# Patient Record
Sex: MALE | URBAN - METROPOLITAN AREA
[De-identification: names, ages, dates, MRNs, and addresses within clinical notes are randomized per-mention and may not be internally consistent; named-entity substitution may affect disease eponyms.]

---

## 2021-12-02 ENCOUNTER — TELEPHONE (OUTPATIENT)
Dept: SURGERY | Age: 26
End: 2021-12-02

## 2021-12-02 ENCOUNTER — OFFICE VISIT (OUTPATIENT)
Dept: SURGERY | Age: 26
End: 2021-12-02

## 2021-12-02 VITALS — WEIGHT: 185.3 LBS | HEIGHT: 70 IN | BODY MASS INDEX: 26.53 KG/M2

## 2021-12-02 DIAGNOSIS — S01.81XA FACIAL LACERATION, INITIAL ENCOUNTER: Primary | ICD-10-CM

## 2021-12-02 PROCEDURE — 12011 RPR F/E/E/N/L/M 2.5 CM/<: CPT | Performed by: PHYSICIAN ASSISTANT

## 2021-12-06 ENCOUNTER — OFFICE VISIT (OUTPATIENT)
Dept: SURGERY | Age: 26
End: 2021-12-06

## 2021-12-06 DIAGNOSIS — S01.21XS LACERATION OF NOSE, SEQUELA: Primary | ICD-10-CM

## 2021-12-06 PROCEDURE — 99024 POSTOP FOLLOW-UP VISIT: CPT | Performed by: PHYSICIAN ASSISTANT

## 2024-09-13 ENCOUNTER — OFFICE VISIT (OUTPATIENT)
Dept: PRIMARY CARE | Facility: CLINIC | Age: 29
End: 2024-09-13
Payer: COMMERCIAL

## 2024-09-13 VITALS
SYSTOLIC BLOOD PRESSURE: 126 MMHG | OXYGEN SATURATION: 96 % | HEART RATE: 68 BPM | DIASTOLIC BLOOD PRESSURE: 78 MMHG | WEIGHT: 191 LBS | TEMPERATURE: 98 F

## 2024-09-13 DIAGNOSIS — Z00.00 ENCOUNTER FOR WELLNESS EXAMINATION: Primary | ICD-10-CM

## 2024-09-13 PROBLEM — Z22.322 MRSA COLONIZATION: Status: ACTIVE | Noted: 2022-05-11

## 2024-09-13 PROCEDURE — 1036F TOBACCO NON-USER: CPT | Performed by: INTERNAL MEDICINE

## 2024-09-13 PROCEDURE — 99385 PREV VISIT NEW AGE 18-39: CPT | Performed by: INTERNAL MEDICINE

## 2024-09-13 RX ORDER — MUPIROCIN 20 MG/G
OINTMENT TOPICAL
COMMUNITY

## 2024-09-13 RX ORDER — TACROLIMUS 1 MG/G
OINTMENT TOPICAL
COMMUNITY
Start: 2024-09-05

## 2024-09-13 RX ORDER — HYDROCORTISONE 25 MG/G
CREAM TOPICAL
COMMUNITY
Start: 2024-08-26

## 2024-09-13 NOTE — PROGRESS NOTES
"Subjective   Patient ID: Shankar Duffy \"Barbie" is a 29 y.o. male who presents for New Patient Visit.    Past medical history includes hyperlipidemia and MRSA colonization.    He is currently seeing Dr. Malgorzata Leahy, dermatology, for treatment of recurrent superficial staph infections with development of increasing antibiotic resistance, currently trying to treat skin primarily.    Reports a history of childhood asthma which he has outgrown.  Still has seasonal allergies for which she takes OTC antihistamines.    No past surgical history other than ear tubes in childhood    We reviewed his labs which did show some mild hyperlipidemia.  Also noted some mild polycythemia and metabolic alkalosis.  Discussed risk factors and signs/symptoms of DEEPIKA.  He thinks he snores, often tired but thinks its due to lack of sleep.  Agreed to monitor.    , works remotely as an  for [employer].  Wife works at [employer].  2 kids, 3-year-old Joy and 1-year-old Thalia.  No history of tobacco alcohol or drug use.  Plays basketball about 3 times a week for exercise.  Objective   Physical Exam    /78   Pulse 68   Temp 36.7 °C (98 °F)   Wt 86.6 kg (191 lb)   SpO2 96%    Gen: NAD, pleasant, A&;Ox3  HEENT: PERRL, EOMI, MMM, OP clear  Neck: supple, no thyromegaly, no JVD, normal carotid upstroke  Pulm: lungs CTAB, good air movement  CV: RRR, no m/r/g, 2+ DP pulses  Abd: NABS, soft, NT, ND no HSM  Ext: no peripheral edema  Neuro: CN II-XII intact, no focal sensory or motor deficits, normal reflexes      Assessment/Plan     Health maintenance  -Metabolic screening reviewed from 2023  -Last colonoscopy: Initiate at 48yo  -PSA: No early screening indicated  -Smoking history: Never  -Counseled regarding diet and exercise  -Immunizations: Reports current  -Followup for AWV and as needed    Cole Taylor MD       "

## 2024-12-17 ENCOUNTER — APPOINTMENT (OUTPATIENT)
Dept: INFECTIOUS DISEASES | Facility: CLINIC | Age: 29
End: 2024-12-17
Payer: COMMERCIAL

## 2024-12-17 VITALS
DIASTOLIC BLOOD PRESSURE: 73 MMHG | HEIGHT: 70 IN | SYSTOLIC BLOOD PRESSURE: 131 MMHG | TEMPERATURE: 97 F | BODY MASS INDEX: 27.41 KG/M2 | HEART RATE: 60 BPM

## 2024-12-17 DIAGNOSIS — Z22.322 MRSA COLONIZATION: Primary | ICD-10-CM

## 2024-12-17 PROCEDURE — 99204 OFFICE O/P NEW MOD 45 MIN: CPT | Performed by: INTERNAL MEDICINE

## 2024-12-17 ASSESSMENT — PAIN SCALES - GENERAL: PAINLEVEL_OUTOF10: 0-NO PAIN

## 2024-12-17 NOTE — PROGRESS NOTES
"Infectious Diseases Clinic Consult Note:    Referred by Cole Taylor MD  Primary MD: Cole Taylor MD  Reason for Consult: Folliculitis     History of Current Issue  Shankar Duffy is a 29 y.o. year old male  referred to establish care in ID for recurrent folliculitis.  Recently moved to [facility] from [facility] to be closer to family.    He has suffered from seborrheic dermatitis since childhood, it involves mostly his scalp and face.    He has received hydrocortisone in the past but is currently on topical tacrolimus.  He was followed in [place] by Dr. Bonnie Crespo  He would use chlorhexidine baths and mupirocin twice daily for 5-day courses, twice monthly.  Despite having had doxycycline resistant MRSA, per documentation his outbreaks of folliculitis would improve despite this.  He was also followed by dermatology in [place] treated and is currently under the care of of Malgorzata Melo MD for dermatology.    Of note, he is  with 2 children.  His 18-month-old also had issues with MRSA.  His infant has not had issues with recurrent skin infections nor has his wife.  Pets.  Currently, he uses daily tacrolimus for seborrhea maintenance as well as intranasal mupirocin 2-3 times daily for 10 days when he develops nasal pustules.    Today, describes everything as \"quiet\"   Last oral antibiotics was 2-3 months ago.  Would like to avoid oral antibiotics if possible  Has used Ketoconazole shampoo and creams in past.    PAST MEDICAL HISTORY:  No past medical history on file.  PAST SURGICAL HISTORY:  No past surgical history on file.  ALLERGIES:    No Known Allergies    MEDICATIONS:      Current Outpatient Medications:     mupirocin (Bactroban) 2 % ointment, APPLY TO AFFECTED AREA 3 TIMES A DAY FOR 10 DAYS, Disp: , Rfl:     tacrolimus (Protopic) 0.1 % ointment, APPLY TWICE A DAY RASH NOSE, Disp: , Rfl:     hydrocortisone 2.5 % cream, TWICE DAILY RASH ON AROUND NOSE (Patient not taking: Reported on 12/17/2024), Disp: , " "Rfl:     FAMILY HISTORY:   Family History   Problem Relation Name Age of Onset    Hyperlipidemia Mother          And other maternal relatives    Fainting Father      Hyperthyroidism Father      Prostate cancer Paternal Grandfather          SOCIAL HISTORY:       Marital Status:    Tobacco / Smokeless tobacco/ Vaping:   reports that he has never smoked. He has never used smokeless tobacco.   Alcohol:   Social History     Substance and Sexual Activity   Alcohol Use Never      Drug Use:    Social History     Substance and Sexual Activity   Drug Use Never       Travel:  [place], [place]   Pets:  None    Service:  No    IMMUNIZATIONS:    Immunization History   Administered Date(s) Administered    Flu vaccine (IIV4), preservative free *Check age/dose* 10/25/2020    Flu vaccine, quadrivalent, no egg protein, age 6 month or greater (FLUCELVAX) 11/23/2021    Flu vaccine, quadrivalent, recombinant, preservative free, adult (FLUBLOK) 11/21/2022    Moderna SARS-CoV-2 Vaccination 04/06/2021, 05/09/2021       REVIEW OF SYSTEMS: All pertinent positives and negatives as documented in HPI.      PHYSICAL EXAMINATION:       Visit Vitals  /73   Pulse 60   Temp 36.1 °C (97 °F)   Ht 1.778 m (5' 10\")   BMI 27.41 kg/m²   Smoking Status Never   BSA 2.07 m²        EXAM:   Constitutional: WD, NAD  Eyes: PERRL, anicteric sclera.   ENT:  MMM, no oral lesions noted, no thrush ; no intranasal lesions in nares  NECK: Supple, no LAD  LUNGS: Breathing unlabored.  Clear in all lung fields.  CVS: RRR, S1 & S2 normal.    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  Last available culture data from a lesion on his nasolabial fold grew MRSA resistant to trimethoprim sulfa, rifampin, and clindamycin.  He has been managed with oral doxycycline once he develops flares of with the lightest.  Previously he had been cycled with decolonization utilizing chlorhexidine baths and intranasal mupirocin twice monthly.  At this point, we are going to monitor without chronic oral antibiotics.  He was given a culture swab    As well as instructions on how to collect a specimen if he develops a pustule again.  We can attempt to culture material while beginning empiric therapy with doxycycline and assessing if the resistance pattern of his Staphylococcus has changed.  I had suggested that his his    Toddler continues to have issues with recurrent staph infection, it may be prudent to attempt decolonize in the entire family while also paying close attention to fomite management.  This would be done to attempt to decolonize family members and break the cycle of recolonization.    He will reach out to my office if new infection develops and a virtual visit can be scheduled.    I spent 55 minutes in the professional and overall care of this patient.      Magali Berg MD  (please reach through EPIC Chat)  Infectious Diseases, Senior Attending Physician

## 2024-12-17 NOTE — LETTER
02/06/25    Cole Taylor MD  [id number] [facility] [address]  [place] [address]      Dear Dr. Cole Taylor MD,    Thank you for referring your patient, Ervin Duffy, to receive care in my office. I have enclosed a summary of the care provided to Ervin on 02/06/25.    Please contact me with any questions you may have regarding the visit.    Sincerely,         Magali Berg MD  [address]  [facility] [address]  [facility] [facility] [address]    CC: No Recipients

## 2024-12-17 NOTE — LETTER
02/06/25    Cole Taylor MD  [address] [facility] [address]  [place] [address]      Dear Dr. Cole Taylor MD,    I am writing to confirm that your patient, Ervin Duffy, received care in my office on 02/06/25. I have enclosed a summary of the care provided to Ervin for your reference.    Please contact me with any questions you may have regarding the visit.    Sincerely,         Magali Berg MD  [id number]  [facility] [address]  [place] [id number]    CC: No Recipients

## 2024-12-17 NOTE — PATIENT INSTRUCTIONS
"It was great to meet you today.  I have given you a swab to collect any drainage if you were to develop a new pustule and could not get in to see me or another of your physicians.  Wipe the area with alcohol and use the swab just at the pustule.  This is mainly to obtain a breakdown of medications that work and monitor resistance, as we dicussed.    We will hold off on oral antibiotics as long as we are able.    If you notice your children having flares of pustules, it would be prudent to   try to decolonize the entire family at the same time as some individuals can be colonized without having any signs or symptoms but Staph can be transmitted from person to person through \"fomites\" = inanimate objects such as towels, bedding, stuffed animals, etc.    If you have another outbreak, reach out to my office and we can arrange for video visit.    Wishing you and your loved ones a very Happy Holiday Season!!  Be safe and see you next year!!     "

## 2025-01-17 ENCOUNTER — TELEPHONE (OUTPATIENT)
Dept: INFECTIOUS DISEASES | Facility: HOSPITAL | Age: 30
End: 2025-01-17
Payer: COMMERCIAL

## 2025-02-06 DIAGNOSIS — A49.02 MRSA (METHICILLIN RESISTANT STAPHYLOCOCCUS AUREUS) INFECTION: Primary | ICD-10-CM

## 2025-02-06 DIAGNOSIS — Z22.322 MRSA COLONIZATION: ICD-10-CM

## 2025-02-06 RX ORDER — LINEZOLID 600 MG/1
600 TABLET, FILM COATED ORAL 2 TIMES DAILY
Qty: 20 TABLET | Refills: 0 | Status: SHIPPED | OUTPATIENT
Start: 2025-02-06 | End: 2025-02-16

## 2025-02-06 RX ORDER — RIFAMPIN 300 MG/1
CAPSULE ORAL
Qty: 20 CAPSULE | Refills: 0 | Status: SHIPPED | OUTPATIENT
Start: 2025-02-06

## 2025-02-06 NOTE — PROGRESS NOTES
Prescriptions sent for 10 days of Linezolid and Rifampin which will hopefully resolve the colonization of MRSA as well as his current folliculitis.  Advised to change bed sheets etc.  Would suggest that his wife ask her PCP for MRSA screening as well as his chidren have also had Staphylococcal issues.

## 2025-05-11 ENCOUNTER — OFFICE VISIT (OUTPATIENT)
Dept: URGENT CARE | Age: 30
End: 2025-05-11
Payer: COMMERCIAL

## 2025-05-11 VITALS
DIASTOLIC BLOOD PRESSURE: 83 MMHG | WEIGHT: 187 LBS | SYSTOLIC BLOOD PRESSURE: 154 MMHG | HEART RATE: 97 BPM | TEMPERATURE: 98.7 F | BODY MASS INDEX: 26.83 KG/M2 | OXYGEN SATURATION: 98 % | RESPIRATION RATE: 18 BRPM

## 2025-05-11 DIAGNOSIS — J02.9 SORE THROAT: ICD-10-CM

## 2025-05-11 DIAGNOSIS — J02.0 STREPTOCOCCAL PHARYNGITIS: Primary | ICD-10-CM

## 2025-05-11 LAB
POC HUMAN RHINOVIRUS PCR: NEGATIVE
POC INFLUENZA A VIRUS PCR: NEGATIVE
POC INFLUENZA B VIRUS PCR: NEGATIVE
POC RESPIRATORY SYNCYTIAL VIRUS PCR: NEGATIVE
POC STREPTOCOCCUS PYOGENES (GROUP A STREP) PCR: POSITIVE

## 2025-05-11 PROCEDURE — 87651 STREP A DNA AMP PROBE: CPT | Performed by: NURSE PRACTITIONER

## 2025-05-11 PROCEDURE — 1036F TOBACCO NON-USER: CPT | Performed by: NURSE PRACTITIONER

## 2025-05-11 PROCEDURE — 99203 OFFICE O/P NEW LOW 30 MIN: CPT | Performed by: NURSE PRACTITIONER

## 2025-05-11 PROCEDURE — 87631 RESP VIRUS 3-5 TARGETS: CPT | Performed by: NURSE PRACTITIONER

## 2025-05-11 RX ORDER — AMOXICILLIN AND CLAVULANATE POTASSIUM 875; 125 MG/1; MG/1
1 TABLET, FILM COATED ORAL 2 TIMES DAILY
Qty: 20 TABLET | Refills: 0 | Status: SHIPPED | OUTPATIENT
Start: 2025-05-11 | End: 2025-05-21

## 2025-05-11 ASSESSMENT — PATIENT HEALTH QUESTIONNAIRE - PHQ9
SUM OF ALL RESPONSES TO PHQ9 QUESTIONS 1 AND 2: 0
1. LITTLE INTEREST OR PLEASURE IN DOING THINGS: NOT AT ALL
2. FEELING DOWN, DEPRESSED OR HOPELESS: NOT AT ALL

## 2025-05-11 ASSESSMENT — ENCOUNTER SYMPTOMS: SORE THROAT: 1

## 2025-05-11 NOTE — PROGRESS NOTES
"Subjective   Patient ID: Shankar Duffy \"Barbie" is a 29 y.o. male. They present today with a chief complaint of Sore Throat.    History of Present Illness    Sore Throat         Past Medical History  Allergies as of 05/11/2025    (No Known Allergies)       Prescriptions Prior to Admission[1]     Medical History[2]    Surgical History[3]     reports that he has never smoked. He has never used smokeless tobacco. He reports that he does not drink alcohol and does not use drugs.    Review of Systems  Review of Systems   HENT:  Positive for sore throat.    All other systems reviewed and are negative.                                 Objective    Vitals:    05/11/25 1408   BP: 154/83   Pulse: 97   Resp: 18   Temp: 37.1 °C (98.7 °F)   SpO2: 98%   Weight: 84.8 kg (187 lb)     No LMP for male patient.    Physical Exam  Vitals and nursing note reviewed.   Constitutional:       Appearance: Normal appearance. He is ill-appearing.   HENT:      Head: Normocephalic.      Right Ear: Tympanic membrane, ear canal and external ear normal.      Left Ear: Tympanic membrane, ear canal and external ear normal.      Nose: Congestion and rhinorrhea present.      Mouth/Throat:      Mouth: Mucous membranes are moist.      Pharynx: Oropharynx is clear. Posterior oropharyngeal erythema present.      Tonsils: No tonsillar exudate or tonsillar abscesses.   Eyes:      Extraocular Movements: Extraocular movements intact.      Pupils: Pupils are equal, round, and reactive to light.   Cardiovascular:      Rate and Rhythm: Normal rate and regular rhythm.      Pulses: Normal pulses.      Heart sounds: Normal heart sounds.   Pulmonary:      Effort: Pulmonary effort is normal.      Breath sounds: Normal breath sounds.   Musculoskeletal:         General: Normal range of motion.      Cervical back: Normal range of motion and neck supple.   Skin:     General: Skin is warm and dry.   Neurological:      General: No focal deficit present.      Mental Status: He " is alert and oriented to person, place, and time.   Psychiatric:         Mood and Affect: Mood normal.         Behavior: Behavior normal.         Procedures    Point of Care Test & Imaging Results from this visit  Results for orders placed or performed in visit on 05/11/25   POCT SPOTFIRE R/ST Panel Mini w/Strep A ([facility]) manually resulted   Result Value Ref Range    POC Group A Strep, PCR Positive (A) Negative    POC Respiratory Syncytial Virus PCR Negative Negative    POC Influenza A Virus PCR Negative Negative    POC Influenza B Virus PCR Negative Negative    POC Human Rhinovirus PCR Negative Negative      Imaging  No results found.    Cardiology, Vascular, and Other Imaging  No other imaging results found for the past 2 days      Diagnostic study results (if any) were reviewed by [facility].    Assessment/Plan   Allergies, medications, history, and pertinent labs/EKGs/Imaging reviewed by ROSALIA Serrano.     Medical Decision Making  30 y/o M sore throat, his immunocompromised child is sick too    rapid strep- POSITIVE    augmentin sent and contagious for 24 hours after first dose of atx  throw toothbrush away after 48 hours  f/u PCP 1 week  s/s worsen with recommendations, go to ER    Take your antibiotics as directed. Do not stop taking them just because you feel better. You need to take the full course of antibiotics.  Strep throat can spread to others until 24 hours after you begin taking antibiotics. During this time, avoid contact with other people at work, school, or home, especially infants and children. Do not sneeze or cough on others, and wash your hands often. Keep your drinking glass and eating utensils separate from those of others. Wash these items well in hot, soapy water.  Gargle with warm salt water at least once each hour to help reduce swelling and make your throat feel better. Use 5 mL (1 tsp) of salt mixed in 250 mL (1 cup) of warm water.  Take an over-the-counter  pain medication, such as acetaminophen (Tylenol), ibuprofen (Advil, Motrin), or naproxen (Aleve). Read and follow all instructions on the label.  Try an over-the-counter anesthetic throat spray or throat lozenges, which may help relieve throat pain.  Drink plenty of fluids. Fluids may help soothe an irritated throat. Hot fluids, such as tea or soup, may help your throat feel better.  Eat soft solids and drink plenty of clear liquids. Flavoured ice pops, ice cream, scrambled eggs, sherbet, and gelatin dessert (such as Jell-O) may also soothe the throat.  Get lots of rest.  Do not smoke, and avoid second-hand smoke. If you need help quitting, talk to your doctor about stop-smoking programs and medicines. These can increase your chances of quitting for good.  Use a vaporizer or humidifier to add moisture to the air in your bedroom. Follow the directions for cleaning the machine.    Seek immediate medical care if:  You have a new or higher fever  You have a fever with a stiff neck or severe headache.  You have new or worse trouble swallowing.  Your sore throat gets much worse on one side.  Your pain becomes much worse on one side of your throat.      Orders and Diagnoses  Diagnoses and all orders for this visit:  Streptococcal pharyngitis  -     amoxicillin-clavulanate (Augmentin) 875-125 mg tablet; Take 1 tablet by mouth 2 times a day for 10 days.  Sore throat  -     POCT SPOTFIRE R/ST Panel Mini w/Strep A ([facility]) manually resulted      Medical Admin Record      Patient disposition: Home    Electronically signed by [facility]  2:35 PM           [1] (Not in a hospital admission)   [2] History reviewed. No pertinent past medical history.  [3] History reviewed. No pertinent surgical history.

## 2025-05-13 ENCOUNTER — TELEPHONE (OUTPATIENT)
Dept: PRIMARY CARE | Facility: CLINIC | Age: 30
End: 2025-05-13
Payer: COMMERCIAL

## 2025-05-13 NOTE — TELEPHONE ENCOUNTER
Patient called stating that he and his son tested positive for strep. He is dealing with insomnia for the past 72 hrs. He has tried OTC Melatonin and that worked for an 1 hr or so. He was wondering if there is something he can do or take?

## 2025-09-18 ENCOUNTER — APPOINTMENT (OUTPATIENT)
Dept: PRIMARY CARE | Facility: CLINIC | Age: 30
End: 2025-09-18
Payer: COMMERCIAL